# Patient Record
Sex: FEMALE | URBAN - NONMETROPOLITAN AREA
[De-identification: names, ages, dates, MRNs, and addresses within clinical notes are randomized per-mention and may not be internally consistent; named-entity substitution may affect disease eponyms.]

---

## 2018-09-18 ENCOUNTER — APPOINTMENT (RX ONLY)
Dept: URBAN - NONMETROPOLITAN AREA CLINIC 1 | Facility: CLINIC | Age: 58
Setting detail: DERMATOLOGY
End: 2018-09-18

## 2018-09-18 DIAGNOSIS — B35.3 TINEA PEDIS: ICD-10-CM

## 2018-09-18 PROBLEM — I10 ESSENTIAL (PRIMARY) HYPERTENSION: Status: ACTIVE | Noted: 2018-09-18

## 2018-09-18 PROBLEM — M12.9 ARTHROPATHY, UNSPECIFIED: Status: ACTIVE | Noted: 2018-09-18

## 2018-09-18 PROCEDURE — ? COUNSELING

## 2018-09-18 PROCEDURE — ? PRESCRIPTION

## 2018-09-18 PROCEDURE — 99202 OFFICE O/P NEW SF 15 MIN: CPT

## 2018-09-18 RX ORDER — MICONAZOLE NITRATE 1 %
KIT TOPICAL
Qty: 1 | Refills: 1 | Status: ERX | COMMUNITY
Start: 2018-09-18

## 2018-09-18 RX ADMIN — MICONAZOLE NITRATE: KIT at 12:44

## 2018-09-18 ASSESSMENT — LOCATION DETAILED DESCRIPTION DERM: LOCATION DETAILED: LEFT DORSAL FOOT

## 2018-09-18 ASSESSMENT — LOCATION ZONE DERM: LOCATION ZONE: FEET

## 2018-09-18 ASSESSMENT — LOCATION SIMPLE DESCRIPTION DERM: LOCATION SIMPLE: LEFT FOOT

## 2018-09-18 NOTE — PROCEDURE: COUNSELING
Patient Specific Counseling (Will Not Stick From Patient To Patient): Keep feet protected from the sun and coloration should stay and/or return to normal after several months.
Detail Level: Detailed

## 2018-09-18 NOTE — HPI: BODY LOCATION - FEET
How Severe Is Your Condition?: moderate
Additional History: Not painful Patient states that she always has edema. Denies itch. Started small and grew.  Nails not affected.  Swims a lot. Began with flaking. No treatments tried.